# Patient Record
Sex: FEMALE | Race: WHITE
[De-identification: names, ages, dates, MRNs, and addresses within clinical notes are randomized per-mention and may not be internally consistent; named-entity substitution may affect disease eponyms.]

---

## 2021-01-06 ENCOUNTER — APPOINTMENT (OUTPATIENT)
Dept: ANTEPARTUM | Facility: CLINIC | Age: 66
End: 2021-01-06
Payer: COMMERCIAL

## 2021-01-06 ENCOUNTER — ASOB RESULT (OUTPATIENT)
Age: 66
End: 2021-01-06

## 2021-01-06 PROCEDURE — 76856 US EXAM PELVIC COMPLETE: CPT | Mod: 59

## 2021-01-06 PROCEDURE — 99072 ADDL SUPL MATRL&STAF TM PHE: CPT

## 2021-01-06 PROCEDURE — 76830 TRANSVAGINAL US NON-OB: CPT | Mod: 59

## 2022-01-19 ENCOUNTER — ASOB RESULT (OUTPATIENT)
Age: 67
End: 2022-01-19

## 2022-01-19 ENCOUNTER — APPOINTMENT (OUTPATIENT)
Dept: ANTEPARTUM | Facility: CLINIC | Age: 67
End: 2022-01-19
Payer: COMMERCIAL

## 2022-01-19 PROCEDURE — 76830 TRANSVAGINAL US NON-OB: CPT | Mod: 59

## 2022-01-19 PROCEDURE — 76856 US EXAM PELVIC COMPLETE: CPT | Mod: 59

## 2022-01-28 ENCOUNTER — APPOINTMENT (OUTPATIENT)
Dept: ANTEPARTUM | Facility: CLINIC | Age: 67
End: 2022-01-28
Payer: COMMERCIAL

## 2022-01-28 ENCOUNTER — APPOINTMENT (OUTPATIENT)
Dept: MATERNAL FETAL MEDICINE | Facility: CLINIC | Age: 67
End: 2022-01-28
Payer: COMMERCIAL

## 2022-01-28 ENCOUNTER — ASOB RESULT (OUTPATIENT)
Age: 67
End: 2022-01-28

## 2022-01-28 PROCEDURE — 76831 ECHO EXAM UTERUS: CPT

## 2022-01-28 PROCEDURE — 58340 CATHETER FOR HYSTEROGRAPHY: CPT

## 2022-01-28 PROCEDURE — ZZZZZ: CPT

## 2022-07-20 ENCOUNTER — ASOB RESULT (OUTPATIENT)
Age: 67
End: 2022-07-20

## 2022-07-20 ENCOUNTER — APPOINTMENT (OUTPATIENT)
Dept: ANTEPARTUM | Facility: CLINIC | Age: 67
End: 2022-07-20

## 2022-07-20 PROCEDURE — 76830 TRANSVAGINAL US NON-OB: CPT

## 2022-07-20 PROCEDURE — 76856 US EXAM PELVIC COMPLETE: CPT | Mod: 59

## 2023-12-11 ENCOUNTER — APPOINTMENT (OUTPATIENT)
Dept: ORTHOPEDIC SURGERY | Facility: CLINIC | Age: 68
End: 2023-12-11
Payer: COMMERCIAL

## 2023-12-11 DIAGNOSIS — M47.816 SPONDYLOSIS W/OUT MYELOPATHY OR RADICULOPATHY, LUMBAR REGION: ICD-10-CM

## 2023-12-11 PROCEDURE — 72100 X-RAY EXAM L-S SPINE 2/3 VWS: CPT

## 2023-12-11 PROCEDURE — 72170 X-RAY EXAM OF PELVIS: CPT

## 2023-12-11 PROCEDURE — 99213 OFFICE O/P EST LOW 20 MIN: CPT

## 2023-12-11 RX ORDER — VALSARTAN 40 MG/1
TABLET, COATED ORAL
Refills: 0 | Status: ACTIVE | COMMUNITY

## 2023-12-13 ENCOUNTER — APPOINTMENT (OUTPATIENT)
Dept: ORTHOPEDIC SURGERY | Facility: CLINIC | Age: 68
End: 2023-12-13

## 2024-07-31 ENCOUNTER — ASOB RESULT (OUTPATIENT)
Age: 69
End: 2024-07-31

## 2024-07-31 ENCOUNTER — APPOINTMENT (OUTPATIENT)
Dept: ANTEPARTUM | Facility: CLINIC | Age: 69
End: 2024-07-31

## 2024-07-31 PROCEDURE — 76830 TRANSVAGINAL US NON-OB: CPT | Mod: 59

## 2024-07-31 PROCEDURE — 76856 US EXAM PELVIC COMPLETE: CPT | Mod: 59

## 2024-08-17 ENCOUNTER — APPOINTMENT (OUTPATIENT)
Dept: ORTHOPEDIC SURGERY | Facility: CLINIC | Age: 69
End: 2024-08-17
Payer: COMMERCIAL

## 2024-08-17 DIAGNOSIS — M51.36 OTHER INTERVERTEBRAL DISC DEGENERATION, LUMBAR REGION: ICD-10-CM

## 2024-08-17 DIAGNOSIS — M54.16 RADICULOPATHY, LUMBAR REGION: ICD-10-CM

## 2024-08-17 DIAGNOSIS — Z00.00 ENCOUNTER FOR GENERAL ADULT MEDICAL EXAMINATION W/OUT ABNORMAL FINDINGS: ICD-10-CM

## 2024-08-17 DIAGNOSIS — E78.00 PURE HYPERCHOLESTEROLEMIA, UNSPECIFIED: ICD-10-CM

## 2024-08-17 DIAGNOSIS — M25.551 PAIN IN RIGHT HIP: ICD-10-CM

## 2024-08-17 PROCEDURE — 72170 X-RAY EXAM OF PELVIS: CPT

## 2024-08-17 PROCEDURE — 99204 OFFICE O/P NEW MOD 45 MIN: CPT

## 2024-08-17 PROCEDURE — 72110 X-RAY EXAM L-2 SPINE 4/>VWS: CPT

## 2024-08-17 RX ORDER — ROSUVASTATIN CALCIUM 5 MG/1
5 TABLET, FILM COATED ORAL
Refills: 0 | Status: ACTIVE | COMMUNITY

## 2024-08-17 RX ORDER — METHYLPREDNISOLONE 4 MG/1
4 TABLET ORAL
Qty: 1 | Refills: 0 | Status: ACTIVE | COMMUNITY
Start: 2024-08-17 | End: 1900-01-01

## 2024-08-17 NOTE — HISTORY OF PRESENT ILLNESS
[10] : 10 [2] : 2 [Sharp] : sharp [Shooting] : shooting [de-identified] : 08/17/2024: Pt is a 67 y/o female presenting of right anterior hip pain. Around the right hip region - Pain started 08/12/24.  Pain starts from right glute/lateral hip/groin that travels down the right leg. A little numbness/tingling.  Not much in the back  No PT/chiro/accupuncture Tried advil/tylenol  No injections   Hx of lower back arthritis, was under the care by Mickey.  No truama.   xrays today: L spine - lumbar scoliosis about 20  ap pelvis - negative   HLD/htn nO HX  OF CANCER nO LOSS OF BB CONTROL  [FreeTextEntry1] : l-spine  [] : no

## 2024-08-17 NOTE — DISCUSSION/SUMMARY
[de-identified] : reviewed the case and the imaging with the patient  lumbar pain and radiculopathy versus right hip  discussion of the condition and treatment options cautions discussed questions answered discussion of natural history of the condition and what the next step would be MRI L spine

## 2024-09-07 ENCOUNTER — APPOINTMENT (OUTPATIENT)
Dept: MRI IMAGING | Facility: CLINIC | Age: 69
End: 2024-09-07
Payer: COMMERCIAL

## 2024-09-07 PROCEDURE — 72148 MRI LUMBAR SPINE W/O DYE: CPT

## 2024-09-07 PROCEDURE — 73721 MRI JNT OF LWR EXTRE W/O DYE: CPT | Mod: RT

## 2024-09-20 ENCOUNTER — APPOINTMENT (OUTPATIENT)
Dept: ORTHOPEDIC SURGERY | Facility: CLINIC | Age: 69
End: 2024-09-20
Payer: COMMERCIAL

## 2024-09-20 DIAGNOSIS — M51.36 OTHER INTERVERTEBRAL DISC DEGENERATION, LUMBAR REGION: ICD-10-CM

## 2024-09-20 DIAGNOSIS — M25.551 PAIN IN RIGHT HIP: ICD-10-CM

## 2024-09-20 PROCEDURE — 20551 NJX 1 TENDON ORIGIN/INSJ: CPT | Mod: RT

## 2024-09-20 PROCEDURE — J3490M: CUSTOM

## 2024-09-20 PROCEDURE — 99214 OFFICE O/P EST MOD 30 MIN: CPT | Mod: 25

## 2024-09-20 NOTE — DISCUSSION/SUMMARY
[de-identified] : reviewed the case and the imaging with the patient  hip bursitis  lumbar spondylosis  discussion of the condition and treatment options cautions discussed questions answered discussion of natural history of the condition and what the next step would be injection for the hip bursitis

## 2024-09-20 NOTE — DATA REVIEWED
[MRI] : MRI [Hip] : hip [Lumbar Spine] : lumbar spine [Report was reviewed and noted in the chart] : The report was reviewed and noted in the chart [I independently reviewed and interpreted images and report] : I independently reviewed and interpreted images and report

## 2024-09-20 NOTE — PROCEDURE
[Tendon Origin] : tendon origin [Right] : of the right [Greater Trochanteric Bursa] : greater trochanteric bursa [Pain] : pain [Alcohol] : alcohol [Betadine] : betadine [Ethyl Chloride sprayed topically] : ethyl chloride sprayed topically [___ cc    1%] : Lidocaine ~Vcc of 1%  [___ cc    0.25%] : Bupivacaine (Marcaine) ~Vcc of 0.25%  [___ cc    10mg] : Triamcinolone (Kenalog) ~Vcc of 10 mg  [Call if redness, pain or fever occur] : call if redness, pain or fever occur [Risks, benefits, alternatives discussed / Verbal consent obtained] : the risks benefits, and alternatives have been discussed, and verbal consent was obtained [FreeTextEntry3] : the pain was 8 prior to the injection and 4 after

## 2024-09-20 NOTE — HISTORY OF PRESENT ILLNESS
[10] : 10 [2] : 2 [Sharp] : sharp [Shooting] : shooting [de-identified] : 08/17/2024: Pt is a 67 y/o female presenting of right anterior hip pain. Around the right hip region - Pain started 08/12/24.  Pain starts from right glute/lateral hip/groin that travels down the right leg. A little numbness/tingling.  Not much in the back  No PT/chiro/accupuncture Tried advil/tylenol  No injections   Hx of lower back arthritis, was under the care by Mickey.  No ella.   xrays today: L spine - lumbar scoliosis about 20  ap pelvis - negative   HLD/htn nO HX  OF CANCER nO LOSS OF BB CONTROL   09/20/2024: follow up on lower back and hip.  Plan at last was "reviewed the case and the imaging with the patient  lumbar pain and radiculopathy versus right hip  discussion of the condition and treatment options cautions discussed questions answered discussion of natural history of the condition and what the next step would be MRI L spine"  States pain had gotten bad on the outside of the right hip - it subsequently improved. She rested and took tylenol arthritis  Had MRI done.   imaging reviewed: reports in the chart - read at [facility]  by my read: MRi L spine - spondylosis  MRi R hip - hip bursitis and strain   [] : no [FreeTextEntry1] : l-spine